# Patient Record
Sex: FEMALE | Race: WHITE | NOT HISPANIC OR LATINO | Employment: FULL TIME | URBAN - METROPOLITAN AREA
[De-identification: names, ages, dates, MRNs, and addresses within clinical notes are randomized per-mention and may not be internally consistent; named-entity substitution may affect disease eponyms.]

---

## 2021-10-18 ENCOUNTER — OFFICE VISIT (OUTPATIENT)
Dept: URGENT CARE | Facility: CLINIC | Age: 21
End: 2021-10-18
Payer: COMMERCIAL

## 2021-10-18 VITALS
OXYGEN SATURATION: 99 % | DIASTOLIC BLOOD PRESSURE: 85 MMHG | HEIGHT: 63 IN | BODY MASS INDEX: 22.15 KG/M2 | HEART RATE: 108 BPM | TEMPERATURE: 98.9 F | WEIGHT: 125 LBS | SYSTOLIC BLOOD PRESSURE: 140 MMHG

## 2021-10-18 DIAGNOSIS — H10.33 ACUTE CONJUNCTIVITIS OF BOTH EYES, UNSPECIFIED ACUTE CONJUNCTIVITIS TYPE: ICD-10-CM

## 2021-10-18 DIAGNOSIS — R30.0 DYSURIA: Primary | ICD-10-CM

## 2021-10-18 LAB
SL AMB  POCT GLUCOSE, UA: NORMAL
SL AMB LEUKOCYTE ESTERASE,UA: NORMAL
SL AMB POCT BILIRUBIN,UA: NORMAL
SL AMB POCT BLOOD,UA: NORMAL
SL AMB POCT CLARITY,UA: NORMAL
SL AMB POCT COLOR,UA: YELLOW
SL AMB POCT KETONES,UA: NORMAL
SL AMB POCT NITRITE,UA: NORMAL
SL AMB POCT PH,UA: 5
SL AMB POCT SPECIFIC GRAVITY,UA: 1.01
SL AMB POCT URINE PROTEIN: NORMAL
SL AMB POCT UROBILINOGEN: 0.2

## 2021-10-18 PROCEDURE — 87086 URINE CULTURE/COLONY COUNT: CPT | Performed by: PHYSICIAN ASSISTANT

## 2021-10-18 PROCEDURE — 81002 URINALYSIS NONAUTO W/O SCOPE: CPT | Performed by: PHYSICIAN ASSISTANT

## 2021-10-18 PROCEDURE — G0382 LEV 3 HOSP TYPE B ED VISIT: HCPCS | Performed by: PHYSICIAN ASSISTANT

## 2021-10-18 PROCEDURE — S9083 URGENT CARE CENTER GLOBAL: HCPCS | Performed by: PHYSICIAN ASSISTANT

## 2021-10-18 RX ORDER — FAMOTIDINE 40 MG/1
40 TABLET, FILM COATED ORAL
COMMUNITY
Start: 2021-09-07

## 2021-10-18 RX ORDER — EPINEPHRINE 0.3 MG/.3ML
INJECTION SUBCUTANEOUS
COMMUNITY
Start: 2021-09-01

## 2021-10-18 RX ORDER — CETIRIZINE HYDROCHLORIDE 10 MG/1
10 TABLET ORAL
COMMUNITY

## 2021-10-18 RX ORDER — TOBRAMYCIN 3 MG/ML
2 SOLUTION/ DROPS OPHTHALMIC
Qty: 3.5 ML | Refills: 0 | Status: SHIPPED | OUTPATIENT
Start: 2021-10-18 | End: 2021-10-25

## 2021-10-18 RX ORDER — SERTRALINE HYDROCHLORIDE 25 MG/1
TABLET, FILM COATED ORAL
COMMUNITY
Start: 2021-09-07

## 2021-10-20 LAB — BACTERIA UR CULT: ABNORMAL

## 2021-10-21 ENCOUNTER — TELEPHONE (OUTPATIENT)
Dept: URGENT CARE | Facility: CLINIC | Age: 21
End: 2021-10-21

## 2023-09-26 ENCOUNTER — APPOINTMENT (EMERGENCY)
Dept: RADIOLOGY | Facility: HOSPITAL | Age: 23
End: 2023-09-26
Payer: COMMERCIAL

## 2023-09-26 ENCOUNTER — HOSPITAL ENCOUNTER (EMERGENCY)
Facility: HOSPITAL | Age: 23
Discharge: HOME | End: 2023-09-26
Attending: STUDENT IN AN ORGANIZED HEALTH CARE EDUCATION/TRAINING PROGRAM
Payer: COMMERCIAL

## 2023-09-26 VITALS
HEIGHT: 63 IN | BODY MASS INDEX: 22.15 KG/M2 | SYSTOLIC BLOOD PRESSURE: 141 MMHG | TEMPERATURE: 98 F | WEIGHT: 125 LBS | OXYGEN SATURATION: 98 % | DIASTOLIC BLOOD PRESSURE: 73 MMHG | RESPIRATION RATE: 16 BRPM | HEART RATE: 86 BPM

## 2023-09-26 DIAGNOSIS — R09.A2 FOREIGN BODY SENSATION IN THROAT: Primary | ICD-10-CM

## 2023-09-26 PROCEDURE — 70360 X-RAY EXAM OF NECK: CPT

## 2023-09-26 PROCEDURE — 99283 EMERGENCY DEPT VISIT LOW MDM: CPT

## 2023-09-26 PROCEDURE — 99284 EMERGENCY DEPT VISIT MOD MDM: CPT | Performed by: PHYSICIAN ASSISTANT

## 2023-09-26 NOTE — DISCHARGE INSTRUCTIONS
There was no obviously identified foreign body noted on your x-ray today. As we discussed you can trial soft foods and liquids until the sensation clears. As we discussed, please return with any new or worsening symptoms and follow-up with your family doctor. Your x-ray was reviewed today in the emergency department and there was no obvious abnormalities found, however, the imaging will be reviewed by the radiologist later this evening or the following day and if there is any abnormalities found you will get a phone call with those results.

## 2023-09-26 NOTE — ED PROVIDER NOTES
History  Chief Complaint   Patient presents with   • Difficulty Swallowing     Pt stating she has foreign body stuck in throat; unsure of item; pain with swallowing     77-year-old female presents the emergency department with mother for evaluation of painful swallowing. Patient states prior to arrival she was eating homemade shepherds pie when she felt something sharp in the bite that she took. States she swallowed and had painful swallowing. States since she has continued with painful swallowing and states it feels like something is stuck in her throat. She denies any vomiting. Has tolerated secretions. Has passed fluids and solids since with discomfort. Prior to Admission Medications   Prescriptions Last Dose Informant Patient Reported? Taking? EPINEPHrine (EPIPEN) 0.3 mg/0.3 mL SOAJ   Yes No   cetirizine (ZyrTEC) 10 mg tablet   Yes No   Sig: Take 10 mg by mouth   famotidine (PEPCID) 40 MG tablet   Yes No   Sig: Take 40 mg by mouth   sertraline (Zoloft) 25 mg tablet   Yes No   Sig: Take by mouth      Facility-Administered Medications: None       No past medical history on file. No past surgical history on file. No family history on file. I have reviewed and agree with the history as documented. E-Cigarette/Vaping     E-Cigarette/Vaping Substances     Social History     Tobacco Use   • Smoking status: Never   • Smokeless tobacco: Never       Review of Systems   Constitutional: Negative. HENT: Positive for sore throat and trouble swallowing. Negative for congestion and voice change. Respiratory: Negative. Negative for cough. Cardiovascular: Negative. Skin: Negative. Neurological: Negative. All other systems reviewed and are negative. Physical Exam  Physical Exam  Vitals and nursing note reviewed. Constitutional:       General: She is not in acute distress. Appearance: Normal appearance. She is not ill-appearing, toxic-appearing or diaphoretic.    HENT: Head: Normocephalic. Mouth/Throat:      Mouth: Mucous membranes are moist.      Pharynx: Oropharynx is clear. No oropharyngeal exudate or posterior oropharyngeal erythema. Eyes:      Conjunctiva/sclera: Conjunctivae normal.   Pulmonary:      Effort: Pulmonary effort is normal.      Breath sounds: Normal breath sounds. No stridor. No wheezing, rhonchi or rales. Musculoskeletal:      Cervical back: Normal range of motion. Lymphadenopathy:      Cervical: No cervical adenopathy. Skin:     General: Skin is warm and dry. Neurological:      General: No focal deficit present. Mental Status: She is alert. Vital Signs  ED Triage Vitals [09/26/23 1624]   Temperature Pulse Respirations Blood Pressure SpO2   98 °F (36.7 °C) 86 16 141/73 98 %      Temp Source Heart Rate Source Patient Position - Orthostatic VS BP Location FiO2 (%)   Tympanic Monitor Sitting Right arm --      Pain Score       10 - Worst Possible Pain           Vitals:    09/26/23 1624   BP: 141/73   Pulse: 86   Patient Position - Orthostatic VS: Sitting         Visual Acuity      ED Medications  Medications - No data to display    Diagnostic Studies  Results Reviewed     None                 XR neck soft tissue    (Results Pending)              Procedures  Procedures         ED Course  ED Course as of 09/26/23 1834 Tue Sep 26, 2023   1832 ED interpretation of x-ray was negative for radiopaque foreign body. Patient is able to tolerate p.o. intake at this time. We discussed appropriate follow-up, symptomatic treatment and return precautions and both patient and mother verbalized understanding. SBIRT 20yo+    Flowsheet Row Most Recent Value   Initial Alcohol Screen: US AUDIT-C     1. How often do you have a drink containing alcohol? 0 Filed at: 09/26/2023 1624   2. How many drinks containing alcohol do you have on a typical day you are drinking? 0 Filed at: 09/26/2023 1624   3a. Male UNDER 65:  How often do you have five or more drinks on one occasion? 0 Filed at: 09/26/2023 1624   3b. FEMALE Any Age, or MALE 65+: How often do you have 4 or more drinks on one occassion? 0 Filed at: 09/26/2023 1624   Audit-C Score 0 Filed at: 09/26/2023 1624   JUVENCIO: How many times in the past year have you. .. Used an illegal drug or used a prescription medication for non-medical reasons? Never Filed at: 09/26/2023 1624                    Medical Decision Making  24-year-old female presents the emergency department for evaluation of painful swallowing after questionable foreign body ingestion. Vitals and medical record reviewed. Patient at risk for foreign body obstruction, esophageal stricture, or body sensation. Posterior pharynx unremarkable on physical exam.  ED interpretation of x-ray negative for acute radiopaque foreign body. Patient is breathing comfortably able to tolerate secretions and passed p.o. intake. We discussed return precautions follow-up and symptomatic care at home. We discussed return precautions and patient verbalized understanding. She is clinically and hemodynamically stable for discharge    Foreign body sensation in throat: acute illness or injury  Amount and/or Complexity of Data Reviewed  Independent Historian: parent     Details: Mother helps provide history  Radiology: ordered. Disposition  Final diagnoses:   Foreign body sensation in throat     Time reflects when diagnosis was documented in both MDM as applicable and the Disposition within this note     Time User Action Codes Description Comment    9/26/2023  5:19 PM Leanord Lights [R09.89] Foreign body sensation in throat       ED Disposition     ED Disposition   Discharge    Condition   Stable    Date/Time   Tue Sep 26, 2023  5:19 PM    Comment   Jennifer Arshad discharge to home/self care.                Follow-up Information     Follow up With Specialties Details Why Contact Info    Valorie Henriquez DO Family Medicine   [address] [place] [address]  [phone]            Discharge Medication List as of 9/26/2023  5:20 PM      CONTINUE these medications which have NOT CHANGED    Details   cetirizine (ZyrTEC) 10 mg tablet Take 10 mg by mouth, Historical Med      EPINEPHrine (EPIPEN) 0.3 mg/0.3 mL SOAJ Starting Wed 9/1/2021, Historical Med      famotidine (PEPCID) 40 MG tablet Take 40 mg by mouth, Starting Tue 9/7/2021, Historical Med      sertraline (Zoloft) 25 mg tablet Take by mouth, Starting Tue 9/7/2021, Historical Med             No discharge procedures on file.     PDMP Review     None          ED Provider  Electronically Signed by           Nelida Villegas PA-C  09/26/23 8996

## 2024-01-06 ENCOUNTER — HOSPITAL ENCOUNTER (EMERGENCY)
Facility: HOSPITAL | Age: 24
Discharge: HOME | End: 2024-01-06
Attending: EMERGENCY MEDICINE
Payer: COMMERCIAL

## 2024-01-06 VITALS
BODY MASS INDEX: 22.15 KG/M2 | OXYGEN SATURATION: 95 % | RESPIRATION RATE: 20 BRPM | WEIGHT: 125 LBS | HEART RATE: 116 BPM | HEIGHT: 63 IN | SYSTOLIC BLOOD PRESSURE: 125 MMHG | TEMPERATURE: 98.5 F | DIASTOLIC BLOOD PRESSURE: 87 MMHG

## 2024-01-06 DIAGNOSIS — J11.1 INFLUENZA: ICD-10-CM

## 2024-01-06 DIAGNOSIS — J32.9 SINUSITIS: Primary | ICD-10-CM

## 2024-01-06 LAB
FLUAV RNA RESP QL NAA+PROBE: POSITIVE
FLUBV RNA RESP QL NAA+PROBE: NEGATIVE
RSV RNA RESP QL NAA+PROBE: NEGATIVE
S PYO DNA THROAT QL NAA+PROBE: NOT DETECTED
SARS-COV-2 RNA RESP QL NAA+PROBE: NEGATIVE

## 2024-01-06 PROCEDURE — 0241U HB NFCT DS VIR RESP RNA 4 TRGT: CPT | Performed by: EMERGENCY MEDICINE

## 2024-01-06 PROCEDURE — 99282 EMERGENCY DEPT VISIT SF MDM: CPT

## 2024-01-06 PROCEDURE — 87651 STREP A DNA AMP PROBE: CPT | Performed by: EMERGENCY MEDICINE

## 2024-01-06 PROCEDURE — 99284 EMERGENCY DEPT VISIT MOD MDM: CPT | Performed by: EMERGENCY MEDICINE

## 2024-01-06 PROCEDURE — 96372 THER/PROPH/DIAG INJ SC/IM: CPT

## 2024-01-06 RX ORDER — GUAIFENESIN/DEXTROMETHORPHAN 100-10MG/5
10 SYRUP ORAL ONCE
Status: COMPLETED | OUTPATIENT
Start: 2024-01-06 | End: 2024-01-06

## 2024-01-06 RX ORDER — IBUPROFEN 600 MG/1
600 TABLET ORAL EVERY 8 HOURS PRN
Qty: 30 TABLET | Refills: 0 | Status: SHIPPED | OUTPATIENT
Start: 2024-01-06

## 2024-01-06 RX ORDER — METHYLPREDNISOLONE SODIUM SUCCINATE 125 MG/2ML
60 INJECTION, POWDER, LYOPHILIZED, FOR SOLUTION INTRAMUSCULAR; INTRAVENOUS ONCE
Status: COMPLETED | OUTPATIENT
Start: 2024-01-06 | End: 2024-01-06

## 2024-01-06 RX ORDER — KETOROLAC TROMETHAMINE 30 MG/ML
30 INJECTION, SOLUTION INTRAMUSCULAR; INTRAVENOUS ONCE
Status: COMPLETED | OUTPATIENT
Start: 2024-01-06 | End: 2024-01-06

## 2024-01-06 RX ORDER — GUAIFENESIN 200 MG/10ML
100-200 LIQUID ORAL EVERY 4 HOURS PRN
Qty: 60 ML | Refills: 0 | Status: SHIPPED | OUTPATIENT
Start: 2024-01-06

## 2024-01-06 RX ORDER — AZITHROMYCIN 500 MG/1
500 TABLET, FILM COATED ORAL EVERY 24 HOURS
Qty: 4 TABLET | Refills: 0 | Status: SHIPPED | OUTPATIENT
Start: 2024-01-07 | End: 2024-01-11

## 2024-01-06 RX ORDER — AZITHROMYCIN 250 MG/1
500 TABLET, FILM COATED ORAL ONCE
Status: COMPLETED | OUTPATIENT
Start: 2024-01-06 | End: 2024-01-06

## 2024-01-06 RX ADMIN — GUAIFENESIN AND DEXTROMETHORPHAN 10 ML: 100; 10 SYRUP ORAL at 22:55

## 2024-01-06 RX ADMIN — AZITHROMYCIN 500 MG: 250 TABLET, FILM COATED ORAL at 22:45

## 2024-01-06 RX ADMIN — METHYLPREDNISOLONE SODIUM SUCCINATE 60 MG: 125 INJECTION, POWDER, FOR SOLUTION INTRAMUSCULAR; INTRAVENOUS at 22:34

## 2024-01-06 RX ADMIN — KETOROLAC TROMETHAMINE 30 MG: 30 INJECTION, SOLUTION INTRAMUSCULAR; INTRAVENOUS at 22:34

## 2024-01-06 NOTE — Clinical Note
Luz Maria Roberto was seen and treated in our emergency department on 1/6/2024.                Diagnosis:     Luz Maria  .    She may return on this date: 01/12/2024         If you have any questions or concerns, please don't hesitate to call.      Cynthia Grace, DO    ______________________________           _______________          _______________  Hospital Representative                              Date                                Time

## 2024-01-06 NOTE — Clinical Note
Luz Maria Roberto was seen and treated in our emergency department on 1/6/2024.                Diagnosis:     Luz Maria  may return to work on return date.    She may return on this date: 01/10/2024         If you have any questions or concerns, please don't hesitate to call.      Cynthia Grace, DO    ______________________________           _______________          _______________  Hospital Representative                              Date                                Time

## 2024-01-07 NOTE — ED PROVIDER NOTES
History  Chief Complaint   Patient presents with    Sore Throat     On Tuesday started with congestion and loss of voice, seen at urgent care wednesday, tested negative for covid/flu and strep , pt. C/o sore throat today and fever, painful to swallow, unable to eat solids, lack of sleep, cough , denies exudate on tonsils      23 yr old female with complaints of cough, congestion, fever sore throat and dysphagia since Tuesday. Patient was seen outpatient at urgent care and tested negative for covid flu and strep. She states runny nose is yellow and green.          Prior to Admission Medications   Prescriptions Last Dose Informant Patient Reported? Taking?   EPINEPHrine (EPIPEN) 0.3 mg/0.3 mL SOAJ   Yes No   cetirizine (ZyrTEC) 10 mg tablet   Yes No   Sig: Take 10 mg by mouth   famotidine (PEPCID) 40 MG tablet   Yes No   Sig: Take 40 mg by mouth   sertraline (Zoloft) 25 mg tablet   Yes No   Sig: Take by mouth      Facility-Administered Medications: None       History reviewed. No pertinent past medical history.    History reviewed. No pertinent surgical history.    History reviewed. No pertinent family history.  I have reviewed and agree with the history as documented.    E-Cigarette/Vaping     E-Cigarette/Vaping Substances    Nicotine No     THC No     CBD No     Flavoring No      Social History     Tobacco Use    Smoking status: Never     Passive exposure: Never    Smokeless tobacco: Never   Substance Use Topics    Alcohol use: Yes     Comment: socially    Drug use: Never       Review of Systems   Constitutional:  Positive for fever. Negative for chills.   HENT:  Positive for congestion, postnasal drip, rhinorrhea, sinus pressure, sinus pain, sore throat and trouble swallowing. Negative for ear pain.    Eyes:  Negative for pain and visual disturbance.   Respiratory:  Positive for cough. Negative for shortness of breath.    Cardiovascular:  Negative for chest pain and palpitations.   Gastrointestinal:  Negative for  abdominal pain and vomiting.   Genitourinary:  Negative for dysuria and hematuria.   Musculoskeletal:  Negative for arthralgias and back pain.   Skin:  Negative for color change and rash.   Neurological:  Negative for seizures and syncope.   All other systems reviewed and are negative.      Physical Exam  Physical Exam  Vitals and nursing note reviewed.   Constitutional:       General: She is not in acute distress.     Appearance: She is well-developed and normal weight. She is not ill-appearing.   HENT:      Head: Normocephalic and atraumatic.      Right Ear: Tympanic membrane and ear canal normal. No swelling or tenderness. No middle ear effusion. Tympanic membrane is not erythematous.      Left Ear: Tympanic membrane and ear canal normal. No swelling or tenderness.  No middle ear effusion.      Nose: Congestion present. No rhinorrhea.      Mouth/Throat:      Mouth: Mucous membranes are moist.      Pharynx: Uvula midline. Pharyngeal swelling present. No oropharyngeal exudate, posterior oropharyngeal erythema or uvula swelling.      Tonsils: No tonsillar exudate or tonsillar abscesses.   Eyes:      Conjunctiva/sclera: Conjunctivae normal.      Pupils: Pupils are equal, round, and reactive to light.   Neck:      Thyroid: No thyromegaly.   Cardiovascular:      Rate and Rhythm: Normal rate and regular rhythm.      Heart sounds: Normal heart sounds. No murmur heard.  Pulmonary:      Effort: Pulmonary effort is normal. No respiratory distress.      Breath sounds: Normal breath sounds.   Abdominal:      Palpations: Abdomen is soft.      Tenderness: There is no abdominal tenderness.   Musculoskeletal:         General: No swelling.      Cervical back: Normal range of motion and neck supple.   Skin:     General: Skin is warm and dry.      Capillary Refill: Capillary refill takes less than 2 seconds.   Neurological:      General: No focal deficit present.      Mental Status: She is alert and oriented to person, place, and  time.   Psychiatric:         Mood and Affect: Mood normal.         Vital Signs  ED Triage Vitals [01/06/24 2147]   Temperature Pulse Respirations Blood Pressure SpO2   98.5 °F (36.9 °C) (!) 116 20 125/87 95 %      Temp Source Heart Rate Source Patient Position - Orthostatic VS BP Location FiO2 (%)   Oral Monitor Sitting Left arm --      Pain Score       9           Vitals:    01/06/24 2147   BP: 125/87   Pulse: (!) 116   Patient Position - Orthostatic VS: Sitting         Visual Acuity      ED Medications  Medications   ketorolac (TORADOL) injection 30 mg (30 mg Intramuscular Given 1/6/24 2234)   methylPREDNISolone sodium succinate (Solu-MEDROL) injection 60 mg (60 mg Intramuscular Given 1/6/24 2234)   azithromycin (ZITHROMAX) tablet 500 mg (500 mg Oral Given 1/6/24 2245)   dextromethorphan-guaiFENesin (ROBITUSSIN DM) oral syrup 10 mL (10 mL Oral Given 1/6/24 2255)       Diagnostic Studies  Results Reviewed       Procedure Component Value Units Date/Time    COVID/FLU/RSV [[id number]]  (Abnormal) Collected: 01/06/24 2156    Lab Status: Final result Specimen: Nares from Nose Updated: 01/06/24 2247     SARS-CoV-2 Negative     INFLUENZA A PCR Positive     INFLUENZA B PCR Negative     RSV PCR Negative    Narrative:      FOR PEDIATRIC PATIENTS - copy/paste COVID Guidelines URL to browser: [email or web address]    SARS-CoV-2 assay is a Nucleic Acid Amplification assay intended for the  qualitative detection of nucleic acid from SARS-CoV-2 in nasopharyngeal  swabs. Results are for the presumptive identification of SARS-CoV-2 RNA.    Positive results are indicative of infection with SARS-CoV-2, the virus  causing COVID-19, but do not rule out bacterial infection or co-infection  with other viruses. Laboratories within the [place] and its  territories are required to report all positive results to the appropriate  public health authorities. Negative results do not  preclude SARS-CoV-2  infection and should not be used as the sole basis for treatment or other  patient management decisions. Negative results must be combined with  clinical observations, patient history, and epidemiological information.  This test has not been FDA cleared or approved.    This test has been authorized by FDA under an Emergency Use Authorization  (EUA). This test is only authorized for the duration of time the  declaration that circumstances exist justifying the authorization of the  emergency use of an in vitro diagnostic tests for detection of SARS-CoV-2  virus and/or diagnosis of COVID-19 infection under section 564(b)(1) of  the Act, 21 U.S.C. 360bbb-3(b)(1), unless the authorization is terminated  or revoked sooner. The test has been validated but independent review by FDA  and CLIA is pending.    Test performed using [employer] GeneXpert: This RT-PCR assay targets N2,  a region unique to SARS-CoV-2. A conserved region in the E-gene was chosen  for pan-Sarbecovirus detection which includes SARS-CoV-2.    According to CMS-2020-01-R, this platform meets the definition of high-throughput technology.    Strep A PCR [[id number]]  (Normal) Collected: 01/06/24 2156    Lab Status: Final result Specimen: Throat Updated: 01/06/24 2228     STREP A PCR Not Detected                   No orders to display              Procedures  Procedures         ED Course                                             Medical Decision Making  Ddx: Sinusiits, tonsilitis, strep pharyngitis, viral syndrome.     Amount and/or Complexity of Data Reviewed  Labs: ordered.    Risk  OTC drugs.  Prescription drug management.             Disposition  Final diagnoses:   Sinusitis   Influenza     Time reflects when diagnosis was documented in both MDM as applicable and the Disposition within this note       Time User Action Codes Description Comment    1/6/2024 10:48 PM Cynthia Grace Add [J32.9] Sinusitis     1/6/2024 10:56 PM Lesly  Dahlia Add [J11.1] Influenza           ED Disposition       ED Disposition   Discharge    Condition   Stable    Date/Time   Sat Jan 6, 2024 10:48 PM    Comment   Luz Maria Roberto discharge to home/self care.                   Follow-up Information       Follow up With Specialties Details Why Contact Info    Shea Betancourt DO Family Medicine In 1 week  [address] [facility] [address]  [phone]              Discharge Medication List as of 1/6/2024 10:57 PM        START taking these medications    Details   azithromycin (ZITHROMAX) 500 MG tablet Take 1 tablet (500 mg total) by mouth every 24 hours for 4 days Do not start before January 7, 2024., Starting Sun 1/7/2024, Until Thu 1/11/2024, Normal      guaiFENesin (ROBITUSSIN) 100 MG/5ML oral liquid Take 5-10 mL (100-200 mg total) by mouth every 4 (four) hours as needed for cough, Starting Sat 1/6/2024, Normal      ibuprofen (MOTRIN) 600 mg tablet Take 1 tablet (600 mg total) by mouth every 8 (eight) hours as needed for moderate pain or fever, Starting Sat 1/6/2024, Normal           CONTINUE these medications which have NOT CHANGED    Details   cetirizine (ZyrTEC) 10 mg tablet Take 10 mg by mouth, Historical Med      EPINEPHrine (EPIPEN) 0.3 mg/0.3 mL SOAJ Starting Wed 9/1/2021, Historical Med      famotidine (PEPCID) 40 MG tablet Take 40 mg by mouth, Starting Tue 9/7/2021, Historical Med      sertraline (Zoloft) 25 mg tablet Take by mouth, Starting Tue 9/7/2021, Historical Med             No discharge procedures on file.    PDMP Review       None            ED Provider  Electronically Signed by             Cynthia Grace DO  01/06/24 9031